# Patient Record
Sex: FEMALE | Race: WHITE | NOT HISPANIC OR LATINO | Employment: PART TIME | URBAN - METROPOLITAN AREA
[De-identification: names, ages, dates, MRNs, and addresses within clinical notes are randomized per-mention and may not be internally consistent; named-entity substitution may affect disease eponyms.]

---

## 2024-09-10 ENCOUNTER — OFFICE VISIT (OUTPATIENT)
Dept: FAMILY MEDICINE | Facility: CLINIC | Age: 49
End: 2024-09-10
Payer: COMMERCIAL

## 2024-09-10 VITALS
HEIGHT: 62 IN | BODY MASS INDEX: 26.72 KG/M2 | RESPIRATION RATE: 18 BRPM | OXYGEN SATURATION: 97 % | WEIGHT: 145.2 LBS | SYSTOLIC BLOOD PRESSURE: 122 MMHG | HEART RATE: 71 BPM | DIASTOLIC BLOOD PRESSURE: 79 MMHG | TEMPERATURE: 97.9 F

## 2024-09-10 DIAGNOSIS — R73.03 PREDIABETES: ICD-10-CM

## 2024-09-10 DIAGNOSIS — N95.1 PERIMENOPAUSAL: ICD-10-CM

## 2024-09-10 DIAGNOSIS — Z23 NEED FOR PROPHYLACTIC VACCINATION AND INOCULATION AGAINST INFLUENZA: ICD-10-CM

## 2024-09-10 DIAGNOSIS — N91.2 AMENORRHEA: ICD-10-CM

## 2024-09-10 DIAGNOSIS — E78.5 HYPERLIPIDEMIA LDL GOAL <100: ICD-10-CM

## 2024-09-10 DIAGNOSIS — Z00.00 ROUTINE GENERAL MEDICAL EXAMINATION AT A HEALTH CARE FACILITY: Primary | ICD-10-CM

## 2024-09-10 DIAGNOSIS — Z12.31 BREAST CANCER SCREENING BY MAMMOGRAM: ICD-10-CM

## 2024-09-10 DIAGNOSIS — F84.5 ASPERGER'S DISORDER: ICD-10-CM

## 2024-09-10 DIAGNOSIS — Z12.11 SCREEN FOR COLON CANCER: ICD-10-CM

## 2024-09-10 LAB
ALBUMIN SERPL BCG-MCNC: 4.4 G/DL (ref 3.5–5.2)
ALP SERPL-CCNC: 53 U/L (ref 40–150)
ALT SERPL W P-5'-P-CCNC: 14 U/L (ref 0–50)
ANION GAP SERPL CALCULATED.3IONS-SCNC: 10 MMOL/L (ref 7–15)
AST SERPL W P-5'-P-CCNC: 20 U/L (ref 0–45)
BILIRUB SERPL-MCNC: 0.3 MG/DL
BUN SERPL-MCNC: 7.7 MG/DL (ref 6–20)
CALCIUM SERPL-MCNC: 9.4 MG/DL (ref 8.8–10.4)
CHLORIDE SERPL-SCNC: 107 MMOL/L (ref 98–107)
CHOLEST SERPL-MCNC: 225 MG/DL
CREAT SERPL-MCNC: 0.61 MG/DL (ref 0.51–0.95)
EGFRCR SERPLBLD CKD-EPI 2021: >90 ML/MIN/1.73M2
ERYTHROCYTE [DISTWIDTH] IN BLOOD BY AUTOMATED COUNT: 11.7 % (ref 10–15)
ESTRADIOL SERPL-MCNC: 460 PG/ML
FASTING STATUS PATIENT QL REPORTED: NO
FSH SERPL IRP2-ACNC: 3.4 MIU/ML
GLUCOSE SERPL-MCNC: 115 MG/DL (ref 70–99)
GLUCOSE SERPL-MCNC: 115 MG/DL (ref 70–99)
HBA1C MFR BLD: 5.5 % (ref 0–5.6)
HCO3 SERPL-SCNC: 22 MMOL/L (ref 22–29)
HCT VFR BLD AUTO: 40.6 % (ref 35–47)
HDLC SERPL-MCNC: 62 MG/DL
HGB BLD-MCNC: 13.7 G/DL (ref 11.7–15.7)
LDLC SERPL CALC-MCNC: 125 MG/DL
MCH RBC QN AUTO: 31.2 PG (ref 26.5–33)
MCHC RBC AUTO-ENTMCNC: 33.7 G/DL (ref 31.5–36.5)
MCV RBC AUTO: 93 FL (ref 78–100)
NONHDLC SERPL-MCNC: 163 MG/DL
PLATELET # BLD AUTO: 248 10E3/UL (ref 150–450)
POTASSIUM SERPL-SCNC: 4.1 MMOL/L (ref 3.4–5.3)
PROT SERPL-MCNC: 6.9 G/DL (ref 6.4–8.3)
RBC # BLD AUTO: 4.39 10E6/UL (ref 3.8–5.2)
SODIUM SERPL-SCNC: 139 MMOL/L (ref 135–145)
TRIGL SERPL-MCNC: 191 MG/DL
TSH SERPL DL<=0.005 MIU/L-ACNC: 1.26 UIU/ML (ref 0.3–4.2)
VIT B12 SERPL-MCNC: 593 PG/ML (ref 232–1245)
WBC # BLD AUTO: 7.8 10E3/UL (ref 4–11)

## 2024-09-10 PROCEDURE — 83036 HEMOGLOBIN GLYCOSYLATED A1C: CPT | Performed by: INTERNAL MEDICINE

## 2024-09-10 PROCEDURE — 99386 PREV VISIT NEW AGE 40-64: CPT | Mod: 25 | Performed by: INTERNAL MEDICINE

## 2024-09-10 PROCEDURE — 85027 COMPLETE CBC AUTOMATED: CPT | Performed by: INTERNAL MEDICINE

## 2024-09-10 PROCEDURE — 84443 ASSAY THYROID STIM HORMONE: CPT | Performed by: INTERNAL MEDICINE

## 2024-09-10 PROCEDURE — 82607 VITAMIN B-12: CPT | Performed by: INTERNAL MEDICINE

## 2024-09-10 PROCEDURE — 80061 LIPID PANEL: CPT | Performed by: INTERNAL MEDICINE

## 2024-09-10 PROCEDURE — 90471 IMMUNIZATION ADMIN: CPT | Performed by: INTERNAL MEDICINE

## 2024-09-10 PROCEDURE — 84146 ASSAY OF PROLACTIN: CPT | Performed by: INTERNAL MEDICINE

## 2024-09-10 PROCEDURE — 90656 IIV3 VACC NO PRSV 0.5 ML IM: CPT | Performed by: INTERNAL MEDICINE

## 2024-09-10 PROCEDURE — 82670 ASSAY OF TOTAL ESTRADIOL: CPT | Performed by: INTERNAL MEDICINE

## 2024-09-10 PROCEDURE — 80053 COMPREHEN METABOLIC PANEL: CPT | Performed by: INTERNAL MEDICINE

## 2024-09-10 PROCEDURE — 36415 COLL VENOUS BLD VENIPUNCTURE: CPT | Performed by: INTERNAL MEDICINE

## 2024-09-10 PROCEDURE — 83001 ASSAY OF GONADOTROPIN (FSH): CPT | Performed by: INTERNAL MEDICINE

## 2024-09-10 ASSESSMENT — PAIN SCALES - GENERAL: PAINLEVEL: NO PAIN (0)

## 2024-09-10 NOTE — PROGRESS NOTES
"Preventive Care Visit  [facility] VJ Middleton MD, Internal Medicine  Sep 10, 2024      Assessment & Plan     Routine general medical examination at a health care facility  This is a new patient to me referred by her brother  She has asked Buerger's disorder but is highly functional  She will get flu shot and COVID shot  She will do a mammogram and is due for Pap smear next year  She does not want to do colonoscopy  She will do a stool study  Perimenopausal  No menstrual bleeding for 2 years  We might have to workup further with prolactin and ultrasound if not menopausal  - REVIEW OF HEALTH MAINTENANCE PROTOCOL ORDERS  - CBC with Platelets    - Follicle stimulating hormone  - Estradiol  - CBC with Platelets    - Follicle stimulating hormone  - Estradiol    Prediabetes  We might have to consider metformin based on the labs  - REVIEW OF HEALTH MAINTENANCE PROTOCOL ORDERS  - Glucose  - Comprehensive metabolic panel  - HEMOGLOBIN A1C  - TSH with free T4 reflex  - Glucose  - Comprehensive metabolic panel  - HEMOGLOBIN A1C  - TSH with free T4 reflex    Hyperlipidemia LDL goal <100  - REVIEW OF HEALTH MAINTENANCE PROTOCOL ORDERS  - Lipid panel reflex to direct LDL Non-fasting  - Vitamin B12  - Lipid panel reflex to direct LDL Non-fasting  - Vitamin B12    Screen for colon cancer    - Fecal colorectal cancer screen (FIT)  - Fecal colorectal cancer screen (FIT)    Breast cancer screening by mammogram    - MA Screening Bilateral w/ Kraig    Need for prophylactic vaccination and inoculation against influenza              BMI  Estimated body mass index is 26.56 kg/m  as calculated from the following:    Height as of this encounter: 1.575 m (5' 2\").    Weight as of this encounter: 65.9 kg (145 lb 3.2 oz).       Counseling  Appropriate preventive services were addressed with this patient via screening, questionnaire, or discussion as appropriate for fall prevention, nutrition, physical activity,  social " engagement, weight loss and cognition.  Checklist reviewing preventive services available has been given to the patient.  Reviewed patient's diet, addressing concerns and/or questions.           Subjective   Dena is a 48 year old, presenting for the following: Very nice 48 years old who has Asperger syndrome  Highly functioning and lives alone  Moved from [facility] to [place] to be close to her family  Has 3 brothers and 2 sisters and her parents doing very by  Her biological mother is   Physical, Establish Care, and Imm/Inj (Flu Shot)         Health Care Directive  Patient does not have a Health Care Directive or Living Will: Discussed advance care planning with patient; however, patient declined at this time.    History of Present Illness       Reason for visit:  Regular checkup   She is taking medications regularly.          Patient continues to work, drive her car and live independently  She does not smoke and has 1 drink per night      9/10/2024   General Health   How would you rate your overall physical health? Good   Feel stress (tense, anxious, or unable to sleep) Not at all            9/10/2024   Nutrition   Three or more servings of calcium each day? Yes   Diet: Regular (no restrictions)   How many servings of fruit and vegetables per day? (!) 0-1   How many sweetened beverages each day? (!) 3            9/10/2024   Exercise   Days per week of moderate/strenous exercise 7 days   Average minutes spent exercising at this level 40 min            9/10/2024   Social Factors   Frequency of gathering with friends or relatives Once a week   Worry food won't last until get money to buy more No   Food not last or not have enough money for food? No   Do you have housing? (Housing is defined as stable permanent housing and does not include staying ouside in a car, in a tent, in an abandoned building, in an overnight shelter, or couch-surfing.) Yes   Are you worried about losing your housing? No   Lack  of transportation? No   Unable to get utilities (heat,electricity)? No            9/10/2024   Dental   Dentist two times every year? Yes            9/10/2024   TB Screening   Were you born outside of the [place]? No            Today's PHQ-2 Score:       9/10/2024     7:55 AM   PHQ-2 ( 1999 Pfizer)   Q1: Little interest or pleasure in doing things 0   Q2: Feeling down, depressed or hopeless 0   PHQ-2 Score 0   Q1: Little interest or pleasure in doing things Not at all   Q2: Feeling down, depressed or hopeless Not at all   PHQ-2 Score 0           9/10/2024   Substance Use   Alcohol more than 3/day or more than 7/wk No   Do you use any other substances recreationally? No        Social History     Tobacco Use    Smoking status: Never    Smokeless tobacco: Never          Mammogram Screening - Mammogram every 1-2 years updated in Health Maintenance based on mutual decision making          9/10/2024   One time HIV Screening   Previous HIV test? No          9/10/2024   STI Screening   New sexual partner(s) since last STI/HIV test? No        History of abnormal Pap smear: No - age 30- 64 PAP with HPV every 5 years recommended       ASCVD Risk   The ASCVD Risk score (Tutu MART, et al., 2019) failed to calculate for the following reasons:    Cannot find a previous HDL lab    Cannot find a previous total cholesterol lab        9/10/2024   Contraception/Family Planning   Questions about contraception or family planning No           Reviewed and updated as needed this visit by Provider     Meds  Problems  Med Hx  Surg Hx  Fam Hx            BP Readings from Last 3 Encounters:   09/10/24 122/79    Wt Readings from Last 3 Encounters:   09/10/24 65.9 kg (145 lb 3.2 oz)                  Patient Active Problem List   Diagnosis    Asperger's disorder    Prediabetes    Hyperlipidemia LDL goal <100     Past Surgical History:   Procedure Laterality Date    eye surgey      age 4 years, correcftive    wisdom teeth         Social  "History     Tobacco Use    Smoking status: Never    Smokeless tobacco: Never   Substance Use Topics    Alcohol use: Not on file     Family History   Problem Relation Age of Onset    Lung Cancer Mother     Colon Cancer Paternal Grandfather          No current outpatient medications on file.     No Known Allergies      Review of Systems  Constitutional, HEENT, cardiovascular, pulmonary, GI, , musculoskeletal, neuro, skin, endocrine and psych systems are negative, except as otherwise noted.     Objective    Exam  /79 (BP Location: Left arm, Patient Position: Sitting, Cuff Size: Adult Regular)   Pulse 71   Temp 97.9  F (36.6  C) (Temporal)   Resp 18   Ht 1.575 m (5' 2\")   Wt 65.9 kg (145 lb 3.2 oz)   SpO2 97%   BMI 26.56 kg/m     Estimated body mass index is 26.56 kg/m  as calculated from the following:    Height as of this encounter: 1.575 m (5' 2\").    Weight as of this encounter: 65.9 kg (145 lb 3.2 oz).    Physical Exam  GENERAL: alert and no distress  EYES: Eyes grossly normal to inspection, PERRL and conjunctivae and sclerae normal  HENT: ear canals and TM's normal, nose and mouth without ulcers or lesions  NECK: no adenopathy, no asymmetry, masses, or scars  RESP: lungs clear to auscultation - no rales, rhonchi or wheezes  BREAST: normal without masses, tenderness or nipple discharge and no palpable axillary masses or adenopathy  CV: regular rate and rhythm, normal S1 S2, no S3 or S4, no murmur, click or rub, no peripheral edema  ABDOMEN: soft, nontender, no hepatosplenomegaly, no masses and bowel sounds normal  MS: no gross musculoskeletal defects noted, no edema  SKIN: no suspicious lesions or rashes  NEURO: Normal strength and tone, mentation intact and speech normal  PSYCH: mentation appears normal, affect normal/bright        Signed Electronically by: Alfredo Middleton MD    "

## 2024-09-10 NOTE — LETTER
September 12, 2024      Dena Handley  [address]  [address] [address]        Dear ,    We are writing to inform you of your test results.    Dena, it was nice to talk to you over phone.   Because you could not talk I am sending you this letter also.     Your labs show excellent blood counts and normal B12 value.   You are not in menopause therefore we should do ultrasound of your pelvis and see a gynecologist.   You should be called about both.     Your cholesterol and blood sugar both are high.   We should repeat them in 6 months after you try to improve your eating better and lose some weight.     Resulted Orders   Glucose   Result Value Ref Range    Glucose 115 (H) 70 - 99 mg/dL    Patient Fasting > 8hrs? No    Lipid panel reflex to direct LDL Non-fasting   Result Value Ref Range    Cholesterol 225 (H) <200 mg/dL    Triglycerides 191 (H) <150 mg/dL    Direct Measure HDL 62 >=50 mg/dL    LDL Cholesterol Calculated 125 (H) <100 mg/dL    Non HDL Cholesterol 163 (H) <130 mg/dL    Patient Fasting > 8hrs? No     Narrative    Cholesterol  Desirable: < 200 mg/dL  Borderline High: 200 - 239 mg/dL  High: >= 240 mg/dL    Triglycerides  Normal: < 150 mg/dL  Borderline High: 150 - 199 mg/dL  High: 200-499 mg/dL  Very High: >= 500 mg/dL    Direct Measure HDL  Female: >= 50 mg/dL   Male: >= 40 mg/dL    LDL Cholesterol  Desirable: < 100 mg/dL  Above Desirable: 100 - 129 mg/dL   Borderline High: 130 - 159 mg/dL   High:  160 - 189 mg/dL   Very High: >= 190 mg/dL    Non HDL Cholesterol  Desirable: < 130 mg/dL  Above Desirable: 130 - 159 mg/dL  Borderline High: 160 - 189 mg/dL  High: 190 - 219 mg/dL  Very High: >= 220 mg/dL   CBC with Platelets     Result Value Ref Range    WBC Count 7.8 4.0 - 11.0 10e3/uL    RBC Count 4.39 3.80 - 5.20 10e6/uL    Hemoglobin 13.7 11.7 - 15.7 g/dL    Hematocrit 40.6 35.0 - 47.0 %    MCV 93 78 - 100 fL    MCH 31.2 26.5 - 33.0 pg    MCHC 33.7 31.5 - 36.5 g/dL    RDW  11.7 10.0 - 15.0 %    Platelet Count 248 150 - 450 10e3/uL   Comprehensive metabolic panel   Result Value Ref Range    Sodium 139 135 - 145 mmol/L    Potassium 4.1 3.4 - 5.3 mmol/L    Carbon Dioxide (CO2) 22 22 - 29 mmol/L    Anion Gap 10 7 - 15 mmol/L    Urea Nitrogen 7.7 6.0 - 20.0 mg/dL    Creatinine 0.61 0.51 - 0.95 mg/dL    GFR Estimate >90 >60 mL/min/1.73m2      Comment:      eGFR calculated using 2021 CKD-EPI equation.    Calcium 9.4 8.8 - 10.4 mg/dL      Comment:      Reference intervals for this test were updated on 7/16/2024 to reflect our healthy population more accurately. There may be differences in the flagging of prior results with similar values performed with this method. Those prior results can be interpreted in the context of the updated reference intervals.    Chloride 107 98 - 107 mmol/L    Glucose 115 (H) 70 - 99 mg/dL    Alkaline Phosphatase 53 40 - 150 U/L    AST 20 0 - 45 U/L    ALT 14 0 - 50 U/L    Protein Total 6.9 6.4 - 8.3 g/dL    Albumin 4.4 3.5 - 5.2 g/dL    Bilirubin Total 0.3 <=1.2 mg/dL    Patient Fasting > 8hrs? No    HEMOGLOBIN A1C   Result Value Ref Range    Hemoglobin A1C 5.5 0.0 - 5.6 %      Comment:      Normal <5.7%   Prediabetes 5.7-6.4%    Diabetes 6.5% or higher     Note: Adopted from ADA consensus guidelines.   TSH with free T4 reflex   Result Value Ref Range    TSH 1.26 0.30 - 4.20 uIU/mL   Vitamin B12   Result Value Ref Range    Vitamin B12 593 232 - 1,245 pg/mL   Follicle stimulating hormone   Result Value Ref Range    FSH 3.4 mIU/mL      Comment:      19 years and older:   Follicular phase: 3.5-12.5 mIU/mL   Ovulation phase: 4.7-21.5 mIU/mL   Luteal phase: 1.7-7.7 mIU/mL   Postmenopause: 25.8-134.8 mIU/mL      Estradiol   Result Value Ref Range    Estradiol 460 pg/mL      Comment:      Healthy Men:   11.3-43.2 pg/mL    Healthy Postmenopausal Women:  Postmenopause: <5-138 pg/mL    Healthy Pregnant Women:  1st trimester: 154-3243 pg/mL  2nd trimester: 1561-27234  pg/mL  3rd trimester: 8525->64518 pg/mL    Healthy Women Cycle Phase:  Follicular: 30.9-90.4 pg/mL  Ovulation: 60.4-533 pg/mL  Luteal: 60.4-232 pg/mL    Healthy Women Cycle Sub-Phase:  Early Follicular: 20.5-62.8 pg/mL  Intermediate Follicular: 26-79.8 pg/mL  Late Follicular: 49.5-233 pg/mL  Ovulation: 60.4-602 pg/mL  Early Luteal: 51.1-179 pg/mL  Intermediate Luteal: 66.5-305 pg/mL  Late Luteal: 30.2-222 pg/mL   Prolactin   Result Value Ref Range    Prolactin 17 5 - 23 ng/mL       If you have any questions or concerns, please call the clinic at the number listed above.       Sincerely,      Alfredo Middleton MD

## 2024-09-10 NOTE — PROGRESS NOTES
{PROVIDER CHARTING PREFERENCE:576525}    Subjective   Dena is a 48 year old, presenting for the following health issues:  No chief complaint on file.  {(!) Visit Details have not yet been documented.  Please enter Visit Details and then use this list to pull in documentation. (Optional):429618}  History of Present Illness       Reason for visit:  Regular checkup   She is taking medications regularly.       {MA/LPN/RN Pre-Provider Visit Orders- hCG/UA/Strep (Optional):803484}  {SUPERLIST (Optional):943663}  {additonal problems for provider to add (Optional):139638}    {ROS Picklists (Optional):376756}      Objective    There were no vitals taken for this visit.  There is no height or weight on file to calculate BMI.  Physical Exam   {Exam List (Optional):970675}    {Diagnostic Test Results (Optional):421115}        Signed Electronically by: Alfredo Middleton MD  {Email feedback regarding this note to [email or web address]   :[id number]}

## 2024-09-10 NOTE — Clinical Note
Can you please inform the patient that she has high cholesterol which should be repeated next year She is not in menopause therefore needs to see GYN and do the ultrasound of the pelvis which has been ordered

## 2024-09-12 ENCOUNTER — TELEPHONE (OUTPATIENT)
Dept: FAMILY MEDICINE | Facility: CLINIC | Age: 49
End: 2024-09-12
Payer: COMMERCIAL

## 2024-09-12 LAB — PROLACTIN SERPL 3RD IS-MCNC: 17 NG/ML (ref 5–23)

## 2024-09-12 NOTE — TELEPHONE ENCOUNTER
Alfredo Middleton MD [facility]  Can you please inform the patient that she has high cholesterol which should be repeated next year  She is not in menopause therefore needs to see GYN and do the ultrasound of the pelvis which has been ordered    Pt informed about provider's message. Closing encounter.

## 2024-09-12 NOTE — RESULT ENCOUNTER NOTE
Dena, it was nice to talk to you over phone.  Because you could not talk I am sending you this letter also.    Your labs show excellent blood counts and normal B12 value.  You are not in menopause therefore we should do ultrasound of your pelvis and see a gynecologist.  You should be called about both.    Your cholesterol and blood sugar both are high.  We should repeat them in 6 months after you try to improve your eating better and lose some weight.

## 2024-09-12 NOTE — TELEPHONE ENCOUNTER
Patient calling clinic back regarding result note. Relayed providers note to patient. Provided GYN referral info and Imaging number to schedule appts.   Pt has no further questions.       Dena, it was nice to talk to you over phone.  Because you could not talk I am sending you this letter also.     Your labs show excellent blood counts and normal B12 value.  You are not in menopause therefore we should do ultrasound of your pelvis and see a gynecologist.  You should be called about both.     Your cholesterol and blood sugar both are high.  We should repeat them in 6 months after you try to improve your eating better and lose some weight.

## 2024-09-13 ENCOUNTER — HOSPITAL ENCOUNTER (OUTPATIENT)
Dept: MAMMOGRAPHY | Facility: CLINIC | Age: 49
Discharge: HOME | End: 2024-09-13
Attending: INTERNAL MEDICINE | Admitting: INTERNAL MEDICINE
Payer: COMMERCIAL

## 2024-09-13 DIAGNOSIS — Z12.31 BREAST CANCER SCREENING BY MAMMOGRAM: ICD-10-CM

## 2024-09-13 PROCEDURE — 77063 BREAST TOMOSYNTHESIS BI: CPT

## 2024-10-02 NOTE — PROGRESS NOTES
SUBJECTIVE:                                                   Dena Handley is a 48 year old female  who presents to clinic today for the following health issue(s):  Secondary amenorrhea.    HPI  Patient is here today to discuss secondary amenorrhea.  She reports that it has been just under 1 year that she has not had a menstrual cycle.  Prior to this, her cycles have been very irregular where she skipped several months.When she does have her menstrual cycle now, she can have bleeding for up to a few weeks at a time and it is heavy.  She did think she was undergoing perimenopause (early menopause in her sister).  However, her testing of FSH and estradiol were as expected in a premenopausal female.  Reports prior to this, she did have very regular cycles.  They occurred every month.  Bleeding was light and lasting approximately 5 days.  She did not have any associated clots.  Cramping was minimal and managed with over-the-counter medications.  She denies any abnormal vaginal discharge.  She has no itching or associated odor.   She has no breast concerns.  She denies any galactorrhea.  She had a normal mammogram.  No changes in her weight.  No hot flashes or night sweats.  No abnormal hair growth or deepening of voice.  She is not noticing any worsening acne.  She has never been sexually active.  She has been able to have Pap smears in the past but these have been very uncomfortable.  She is due for Pap smear next year (all screening has been normal).  She would prefer to defer pelvic exam today.  Only medication includes sertraline (not currently taking) and vitamin D.  Her provider did schedule a pelvic ultrasound but this has not yet been completed.  She has no history of prior abdominal surgery or uterine surgery.  Would like her COVID shot today.  Has already received her flu shot.    No LMP recorded (lmp unknown). (Menstrual status: Irregular Periods)..     Patient has not been sexually  active.  Using not sexually active for contraception.    reports that she has never smoked. She has never used smokeless tobacco.  STD testing offered?  N/A, Never sexually active    Health maintenance updated:  yes    Today's PHQ-2 Score:       9/10/2024     7:55 AM   PHQ-2 ( 1999 Pfizer)   Q1: Little interest or pleasure in doing things 0   Q2: Feeling down, depressed or hopeless 0   PHQ-2 Score 0   Q1: Little interest or pleasure in doing things Not at all   Q2: Feeling down, depressed or hopeless Not at all   PHQ-2 Score 0     Today's PHQ-9 Score:       10/4/2024     3:00 PM   PHQ-9 SCORE   PHQ-9 Total Score MyChart 0   PHQ-9 Total Score 0     Today's YOVANY-7 Score:       10/4/2024     3:01 PM   YOVANY-7 SCORE   Total Score 0 (minimal anxiety)   Total Score 0     Answers submitted by the patient for this visit:  Patient Health Questionnaire (Submitted on 10/4/2024)  If you checked off any problems, how difficult have these problems made it for you to do your work, take care of things at home, or get along with other people?: Not difficult at all  PHQ9 TOTAL SCORE: 0  Patient Health Questionnaire (G7) (Submitted on 10/4/2024)  YOVANY 7 TOTAL SCORE: 0    Problem list and histories reviewed & adjusted, as indicated.  Additional history: as documented.    Patient Active Problem List   Diagnosis    Asperger's disorder    Prediabetes    Hyperlipidemia LDL goal <100    Chronic bilateral low back pain without sciatica    Anxiety and depression    Vitamin D deficiency     Past Surgical History:   Procedure Laterality Date    eye surgey      age 4 years, correcftive    wisdom teeth        Social History     Tobacco Use    Smoking status: Never    Smokeless tobacco: Never   Substance Use Topics    Alcohol use: Not on file      Problem (# of Occurrences) Relation (Name,Age of Onset)    Colon Cancer (1) Paternal Grandfather (90)    Lung Cancer (1) Mother              Current Outpatient Medications   Medication Sig Dispense Refill     "Cholecalciferol (D 1000) 25 MCG (1000 UT) CAPS Take 2,000 Units by mouth.      sertraline (ZOLOFT) 50 MG tablet Take 1 tablet by mouth daily. (Patient not taking: Reported on 10/4/2024)       No current facility-administered medications for this visit.     No Known Allergies    ROS:  12 point review of systems negative other than symptoms noted below or in the HPI.      OBJECTIVE:     /72   Ht 1.575 m (5' 2\")   Wt 65.4 kg (144 lb 3.2 oz)   LMP  (LMP Unknown)   BMI 26.37 kg/m    Body mass index is 26.37 kg/m .    Exam:  Constitutional:  Appearance: Well nourished, well developed alert, in no acute distress     In-Clinic Test Results:  No results found for this or any previous visit (from the past 24 hour(s)).    ASSESSMENT/PLAN:                                                        ICD-10-CM    1. Amenorrhea  N91.2 Ob/Gyn  Referral          Patient is here today to discuss secondary amenorrhea and ongoing workup.  She has had a normal TSH to rule out any thyroid dysfunction.  Prolactin was normal and she denies any breast symptoms to suggest pituitary adenoma.  FSH and E2 were in appropriate premenopausal ranges, no evidence to suggest premature ovarian failure.  Patient has not had any prior abdominal surgery or uterine surgery or risk of STI/PID to suggest any ideations or intrauterine  adhesions.  She does not have any clinical signs of hyperandrogenism, but I would recommend proceeding with a pelvic ultrasound to assess for possible PCOS.  she previously had normal menstrual cycle so nothing to suggest an outflow tract issue. Did decline pelvic exam today but may reconsider pending US results. If the pelvic ultrasound does suggest PCOS, we will further discuss scheduled withdrawal bleeds and long-term management.  If pelvic ultrasound is negative, plan for progesterone withdrawal test to assess the endometrial lining.  If all of this is negative, can consider repeat FSH/E2/prolactin.  Patient " was scheduled for a pelvic ultrasound and a follow-up visit to review the results.  We discussed the components of an abdominal as well as a vaginal probe for the pelvic ultrasound. With her history of pain with pap smears, if she does not desire the transvaginal ultrasound, she knows that she can decline this at the time of the study.    Kaur Pedraza MD  [facility] [place]    On 10/04/24, a total of 45 minutes was spent caring for the patient, reviewing labs/vitals/images and EPIC notes, placing orders, documenting and coordinating care and in medical decision making.

## 2024-10-04 ENCOUNTER — OFFICE VISIT (OUTPATIENT)
Dept: OBGYN | Facility: CLINIC | Age: 49
End: 2024-10-04
Attending: INTERNAL MEDICINE
Payer: COMMERCIAL

## 2024-10-04 VITALS
HEIGHT: 62 IN | WEIGHT: 144.2 LBS | SYSTOLIC BLOOD PRESSURE: 118 MMHG | BODY MASS INDEX: 26.54 KG/M2 | DIASTOLIC BLOOD PRESSURE: 72 MMHG

## 2024-10-04 DIAGNOSIS — Z23 HIGH PRIORITY FOR 2019-NCOV VACCINE: Primary | ICD-10-CM

## 2024-10-04 DIAGNOSIS — N91.2 AMENORRHEA: ICD-10-CM

## 2024-10-04 PROBLEM — E55.9 VITAMIN D DEFICIENCY: Status: ACTIVE | Noted: 2019-04-07

## 2024-10-04 PROBLEM — M54.50 CHRONIC BILATERAL LOW BACK PAIN WITHOUT SCIATICA: Status: ACTIVE | Noted: 2017-05-05

## 2024-10-04 PROBLEM — G89.29 CHRONIC BILATERAL LOW BACK PAIN WITHOUT SCIATICA: Status: ACTIVE | Noted: 2017-05-05

## 2024-10-04 PROCEDURE — 91320 SARSCV2 VAC 30MCG TRS-SUC IM: CPT | Performed by: OBSTETRICS & GYNECOLOGY

## 2024-10-04 PROCEDURE — 90480 ADMN SARSCOV2 VAC 1/ONLY CMP: CPT | Performed by: OBSTETRICS & GYNECOLOGY

## 2024-10-04 PROCEDURE — 99204 OFFICE O/P NEW MOD 45 MIN: CPT | Mod: 25 | Performed by: OBSTETRICS & GYNECOLOGY

## 2024-10-04 RX ORDER — AMPICILLIN TRIHYDRATE 500 MG
2000 CAPSULE ORAL
COMMUNITY
Start: 2023-09-08

## 2024-10-04 ASSESSMENT — PATIENT HEALTH QUESTIONNAIRE - PHQ9
SUM OF ALL RESPONSES TO PHQ QUESTIONS 1-9: 0
10. IF YOU CHECKED OFF ANY PROBLEMS, HOW DIFFICULT HAVE THESE PROBLEMS MADE IT FOR YOU TO DO YOUR WORK, TAKE CARE OF THINGS AT HOME, OR GET ALONG WITH OTHER PEOPLE: NOT DIFFICULT AT ALL
SUM OF ALL RESPONSES TO PHQ QUESTIONS 1-9: 0

## 2024-10-04 ASSESSMENT — ANXIETY QUESTIONNAIRES
2. NOT BEING ABLE TO STOP OR CONTROL WORRYING: NOT AT ALL
1. FEELING NERVOUS, ANXIOUS, OR ON EDGE: NOT AT ALL
3. WORRYING TOO MUCH ABOUT DIFFERENT THINGS: NOT AT ALL
8. IF YOU CHECKED OFF ANY PROBLEMS, HOW DIFFICULT HAVE THESE MADE IT FOR YOU TO DO YOUR WORK, TAKE CARE OF THINGS AT HOME, OR GET ALONG WITH OTHER PEOPLE?: NOT DIFFICULT AT ALL
4. TROUBLE RELAXING: NOT AT ALL
IF YOU CHECKED OFF ANY PROBLEMS ON THIS QUESTIONNAIRE, HOW DIFFICULT HAVE THESE PROBLEMS MADE IT FOR YOU TO DO YOUR WORK, TAKE CARE OF THINGS AT HOME, OR GET ALONG WITH OTHER PEOPLE: NOT DIFFICULT AT ALL
6. BECOMING EASILY ANNOYED OR IRRITABLE: NOT AT ALL
7. FEELING AFRAID AS IF SOMETHING AWFUL MIGHT HAPPEN: NOT AT ALL
7. FEELING AFRAID AS IF SOMETHING AWFUL MIGHT HAPPEN: NOT AT ALL
GAD7 TOTAL SCORE: 0
5. BEING SO RESTLESS THAT IT IS HARD TO SIT STILL: NOT AT ALL

## 2024-11-21 NOTE — PROGRESS NOTES
SUBJECTIVE:                                                   Dena Handley is a 49 year old female who presents to clinic today for the following health issue(s):  Patient presents with:  Follow Up: US follow up     HPI:  Here for follow up of secondary amenorrhea. See previous note from 10/4/2024. Last visit she reported no cycle for 1 year. States just had a heavier cycle for the first time in November (). Cycle was heavier and associated with more cramping.   Had discussed getting an US to evaluate the ovaries for possible PCOS component.   TAUS done today showing a 7.6 mm endometrial stripe with some cystic appearance. R ovary appears normal. L ovary unable to see. Unable to tolerate TVUS.   She has already had FSH testing that was in the premenopausal range (3.4).   PMH: She denies any significant PMH that she follows with a PCP for.       Patient's last menstrual period was 2024 (exact date)..     Patient is not sexually active, .  Using none for contraception.    reports that she has never smoked. She has never used smokeless tobacco.    STD testing offered?  Declined    Health maintenance updated:  yes    Today's PHQ-2 Score:       9/10/2024     7:55 AM   PHQ-2 (  [employer])   Q1: Little interest or pleasure in doing things 0    Q2: Feeling down, depressed or hopeless 0    PHQ-2 Score 0   Q1: Little interest or pleasure in doing things Not at all   Q2: Feeling down, depressed or hopeless Not at all   PHQ-2 Score 0       Patient-reported     Today's PHQ-9 Score:       10/4/2024     3:00 PM   PHQ-9 SCORE   PHQ-9 Total Score MyChart 0   PHQ-9 Total Score 0     Today's YOVANY-7 Score:       10/4/2024     3:01 PM   YOVANY-7 SCORE   Total Score 0 (minimal anxiety)   Total Score 0       Problem list and histories reviewed & adjusted, as indicated.  Additional history: as documented.    Patient Active Problem List   Diagnosis    Asperger's disorder    Prediabetes    Hyperlipidemia LDL goal <100     Chronic bilateral low back pain without sciatica    Anxiety and depression    Vitamin D deficiency     Past Surgical History:   Procedure Laterality Date    eye surgey      age 4 years, correcftive    wisdom teeth        Social History     Tobacco Use    Smoking status: Never    Smokeless tobacco: Never   Substance Use Topics    Alcohol use: Never      Problem (# of Occurrences) Relation (Name,Age of Onset)    Colon Cancer (1) Paternal Grandfather (90)    Lung Cancer (1) Mother              Current Outpatient Medications   Medication Sig Dispense Refill    Cholecalciferol (D 1000) 25 MCG (1000 UT) CAPS Take 2,000 Units by mouth.      sertraline (ZOLOFT) 50 MG tablet Take 1 tablet by mouth daily.       No current facility-administered medications for this visit.     No Known Allergies    ROS:  12 point review of systems negative other than symptoms noted below or in the HPI.  No urinary frequency or dysuria, bladder or kidney problems      OBJECTIVE:     /64   Wt 66.7 kg (147 lb)   LMP 11/02/2024 (Exact Date)   BMI 26.89 kg/m    Body mass index is 26.89 kg/m .    Exam:  Constitutional:  Appearance: Well nourished, well developed alert, in no acute distress     In-Clinic Test Results:  Gynecological Ultrasound Report  Pelvic U/S - Transabdominal and Transvaginal  [facility]  Referring Provider: Dr. Kaur Pedraza  Sonographer:  Bonita Shelton RDMS  Indication: Amenorrhea  LMP: 11/02/24  History:   Gynecological Ultrasonography:   Uterus: anteverted. Contour is smooth/regular.  Size: 8.00 x 5.58 x 4.72 cm  Endometrium: Thickness Total 7.62 mm  Findings: Slight cystic appearance to endometrium  Right Ovary: 3.18 x 2.66 x 1.59 cm. Wnl  Left Ovary: Not seen  Cul de Sac Free Fluid: No free fluid  Technique: Transvaginal Imaging performed  Transabdominal Imaging performed     Impression:    Anteverted uterus with 7.6 mm endometrium. Endometrium not well visualized, but there is cystic  appearance to endometrium. In setting of Amenorrhea, endometrial sampling is recommended.  Right ovary appears normal  Left ovary is not visualized.    ASSESSMENT/PLAN:                                                        ICD-10-CM    1. Amenorrhea  N91.2       2. Abnormal ultrasound  R93.89         - Here for follow up of secondary amenorrhea. Patient has gone 1 year without a cycle (not just had bleeding in November) and concern for thickened cystic stripe on TAUS with recommendation for sampling. I discussed with the patient the goal of ruling out a malignancy or precancer with the biopsy. Patient notes pelvic exams are very uncomfortable and was not able to tolerate pelvic US>. I feel this would be best be completed under sedation. We discussed options for clinic sedation (for which I think she would be a good candidate) vs OR sedation in the hospital. At the same time, we could also complete her pap smear while she is comfortable. I discussed the risks/benefits/alternatives to endometrial biopsy, pap smear collection including infection, bleeding, and damage to other structures including uterine perforation. Discussed this would be done with sedation and she would require a  to bring her home. She would be interested in doing this in January from a logistic perspective. If biopsy is negative, can also consider repeating FSH and estradiol in approximately 3 months     Kaur Pedraza MD  [facility] [place]    Please schedule CRNA for:    Patient Name:  Dena Handley ([id number]).  :  1975      Expected time off from work:  Day of procedure  Surgeon:  Kaur Pedraza MD  Procedure permit to read:  exam under anesthesia, cervical papanicolaou smear, endometrial biopsy    Anesthesia:  CRNA  EQUIPMENT NEEDS: Pap supplies, EMB supplies  DIAGNOSIS: Amenorrhea    BMI MUST BE <35. WHAT IS THE PT BMI? 26  PATIENT WEIGHT 65 kg      POST OP TO BE DONE IN 2 WEEKS for 30  MINSTACY    ANY ADDIDTIONAL MEDS OR INFORMATION NEEDED Patient would prefer January for logistic purposes.

## 2024-11-22 ENCOUNTER — OFFICE VISIT (OUTPATIENT)
Dept: OBGYN | Facility: CLINIC | Age: 49
End: 2024-11-22
Attending: INTERNAL MEDICINE
Payer: COMMERCIAL

## 2024-11-22 ENCOUNTER — ANCILLARY PROCEDURE (OUTPATIENT)
Dept: ULTRASOUND IMAGING | Facility: CLINIC | Age: 49
End: 2024-11-22
Attending: INTERNAL MEDICINE
Payer: COMMERCIAL

## 2024-11-22 VITALS — BODY MASS INDEX: 26.89 KG/M2 | DIASTOLIC BLOOD PRESSURE: 64 MMHG | WEIGHT: 147 LBS | SYSTOLIC BLOOD PRESSURE: 120 MMHG

## 2024-11-22 DIAGNOSIS — R93.89 ABNORMAL ULTRASOUND: ICD-10-CM

## 2024-11-22 DIAGNOSIS — N91.2 AMENORRHEA: ICD-10-CM

## 2024-11-22 DIAGNOSIS — N91.2 AMENORRHEA: Primary | ICD-10-CM

## 2024-11-22 PROCEDURE — 76830 TRANSVAGINAL US NON-OB: CPT | Performed by: OBSTETRICS & GYNECOLOGY

## 2024-11-22 PROCEDURE — 99213 OFFICE O/P EST LOW 20 MIN: CPT | Performed by: OBSTETRICS & GYNECOLOGY

## 2024-11-22 PROCEDURE — G2211 COMPLEX E/M VISIT ADD ON: HCPCS | Performed by: OBSTETRICS & GYNECOLOGY

## 2024-11-22 PROCEDURE — 76856 US EXAM PELVIC COMPLETE: CPT | Performed by: OBSTETRICS & GYNECOLOGY

## 2024-11-25 ENCOUNTER — TELEPHONE (OUTPATIENT)
Dept: OBGYN | Facility: CLINIC | Age: 49
End: 2024-11-25

## 2024-11-25 NOTE — TELEPHONE ENCOUNTER
Please schedule CRNA for:     Patient Name:  Dena Handley ([id number]).  :  1975        Expected time off from work:  Day of procedure  Surgeon:  Kaur Pedraza MD  Procedure permit to read:  exam under anesthesia, cervical papanicolaou smear, endometrial biopsy     Anesthesia:  CRNA  EQUIPMENT NEEDS: Pap supplies, EMB supplies  DIAGNOSIS: Amenorrhea     BMI MUST BE <35. WHAT IS THE PT BMI? 26  PATIENT WEIGHT 65 kg        POST OP TO BE DONE IN 2 WEEKS for 30 MINNUTES     ANY ADDIDTIONAL MEDS OR INFORMATION NEEDED Patient would prefer January for logistic purposes.

## 2024-11-26 NOTE — TELEPHONE ENCOUNTER
Spoke to pt and advised we do not have a January schedule yet but will call her when we do    Genny Salas  Surgery Scheduler

## 2024-12-10 NOTE — TELEPHONE ENCOUNTER
CRNA SCHEDULED    2/26/2025 7:30a   ARRIVAL 7:15a  ADVISED VIA PHONE AND EMAIL, NPO,  NEEDED, SHOWER NORMAL  Email used on file [email or web address]    POST OP 3/18/2025 10:15a (3 weeks post op due to [place] vacation)    Genny Salas  Surgery Scheduler

## 2024-12-18 NOTE — TELEPHONE ENCOUNTER
Spoke to pt regarding time change. Also sent email    CRNA 2/26/2025 8:30a  ARRIVAL 8:15a    Genny Salas  Surgery Scheduler

## 2024-12-29 ENCOUNTER — HEALTH MAINTENANCE LETTER (OUTPATIENT)
Age: 49
End: 2024-12-29

## 2025-01-22 NOTE — TELEPHONE ENCOUNTER
CRNA case time has changed    CRNA 2/26/2025 9:30  ARRIVAL 9:15a  Pt aware via phone call and EMAIL sent    Genny Salas  Surgery Scheduler

## 2025-01-23 ENCOUNTER — OFFICE VISIT (OUTPATIENT)
Dept: FAMILY MEDICINE | Facility: CLINIC | Age: 50
End: 2025-01-23
Payer: COMMERCIAL

## 2025-01-23 VITALS
WEIGHT: 148 LBS | RESPIRATION RATE: 20 BRPM | OXYGEN SATURATION: 99 % | TEMPERATURE: 98.1 F | SYSTOLIC BLOOD PRESSURE: 128 MMHG | BODY MASS INDEX: 27.07 KG/M2 | HEART RATE: 82 BPM | DIASTOLIC BLOOD PRESSURE: 84 MMHG

## 2025-01-23 DIAGNOSIS — H61.23 BILATERAL IMPACTED CERUMEN: Primary | ICD-10-CM

## 2025-01-23 ASSESSMENT — PAIN SCALES - GENERAL: PAINLEVEL_OUTOF10: NO PAIN (0)

## 2025-01-23 NOTE — PROGRESS NOTES
L Ear irrigated per providers instructions 100% cerumen removed patient reported no discomfort   PT was satisfied informed provider.  Provider talked to pt and said pt can head home  Hien Johnson MA on 1/23/2025 at 5:45 PM

## 2025-01-23 NOTE — PROGRESS NOTES
Assessment & Plan     Bilateral cerumen impaction  - WA REMOVAL IMPACTED CERUMEN IRRIGATION/LVG UNILAT  Placed several drops of H2O2 into left ear. Post irrigation by clinic staff, TM's fully visualized. Patient reports hearing has improved.      Subjective   Dena is a 49 year old, presenting for the following health issues:  Cerumen (impacted)      1/23/2025     4:31 PM   Additional Questions   Roomed by Luda PATE     History of Present Illness       Reason for visit:  To have wax buildup in my ears cleaned out   She is taking medications regularly.           Objective    /84 (BP Location: Right arm, Patient Position: Sitting, Cuff Size: Adult Regular)   Pulse 82   Temp 98.1  F (36.7  C) (Temporal)   Resp 20   Wt 67.1 kg (148 lb)   LMP 11/01/2024 (Within Weeks)   SpO2 99%   BMI 27.07 kg/m    Body mass index is 27.07 kg/m .  Physical Exam   GENERAL: alert and no distress  HENT: post irrigation: ear canals and TM's normal, nose and mouth without ulcers or lesions  PSYCH: mentation appears normal, affect normal/bright            Signed Electronically by: Davion Deras NP

## 2025-02-25 NOTE — TELEPHONE ENCOUNTER
Pt called stating she needs to RS due to no ride  RS her as below.  Advised if she can not get a ride the sooner we know the better so we aren't making last minute changes  She stated she didn't know her parents were on vacation    CRNA 3/20/2025 7:30a  ARRIVAL 7:15a    Pt aware via phone and  sent    Genny Salas  Surgery Scheduler

## 2025-03-13 ENCOUNTER — MYC MEDICAL ADVICE (OUTPATIENT)
Dept: OBGYN | Facility: CLINIC | Age: 50
End: 2025-03-13
Payer: COMMERCIAL

## 2025-03-13 NOTE — TELEPHONE ENCOUNTER
"Panel Management Review    Date of last visit with a [facility] provider: Dr. Pedraza on {11/22/2024  Date of next visit with a [facility] provider: 3/20/2025    Health Maintenance List    Health Maintenance   Topic Date Due    COLORECTAL CANCER SCREENING  Never done    A1C  12/10/2024    PAP  08/11/2025    YEARLY PREVENTIVE VISIT  09/10/2025    CMP  09/10/2025    LIPID  09/10/2025    CBC  09/10/2025    MAMMO SCREENING  09/13/2026    DTAP/TDAP/TD IMMUNIZATION (2 - Td or Tdap) 05/05/2027    GLUCOSE  09/10/2027    ZOSTER IMMUNIZATION (1 of 2) 11/05/2027    ADVANCE CARE PLANNING  09/10/2029    HEPATITIS C SCREENING  Completed    PHQ-2 (once per calendar year)  Completed    INFLUENZA VACCINE  Completed    COVID-19 Vaccine  Completed    Pneumococcal Vaccine: Pediatrics (0 to 5 Years) and At-Risk Patients (6 to 49 Years)  Aged Out    HPV IMMUNIZATION  Aged Out    MENINGITIS IMMUNIZATION  Aged Out    HIV SCREENING  Discontinued    HEPATITIS B IMMUNIZATION  Discontinued       Composite cancer screening  Chart review shows that this patient is due/due soon for the following Colonoscopy  No results found for: \"PAP\"  Past Surgical History:   Procedure Laterality Date    eye surgey      age 4 years, correcftive    wisdom teeth       Summary:    Patient is due/failing the following:   Colonoscopy    Action needed: Patient needs to schedule colonoscopy     Type of outreach:  Sent [employer] message.      Staff Signature:  Izzy Cano MA on 3/13/2025 at 1:45 PM     "

## 2025-03-15 ENCOUNTER — LAB (OUTPATIENT)
Dept: LAB | Facility: CLINIC | Age: 50
End: 2025-03-15
Payer: COMMERCIAL

## 2025-03-15 DIAGNOSIS — R73.03 PREDIABETES: Primary | ICD-10-CM

## 2025-03-15 DIAGNOSIS — E78.5 HYPERLIPIDEMIA LDL GOAL <100: ICD-10-CM

## 2025-03-15 LAB
EST. AVERAGE GLUCOSE BLD GHB EST-MCNC: 108 MG/DL
HBA1C MFR BLD: 5.4 % (ref 0–5.6)

## 2025-03-15 PROCEDURE — 83036 HEMOGLOBIN GLYCOSYLATED A1C: CPT

## 2025-03-15 PROCEDURE — 80061 LIPID PANEL: CPT

## 2025-03-15 PROCEDURE — 36415 COLL VENOUS BLD VENIPUNCTURE: CPT

## 2025-03-16 LAB
CHOLEST SERPL-MCNC: 238 MG/DL
FASTING STATUS PATIENT QL REPORTED: YES
HDLC SERPL-MCNC: 54 MG/DL
LDLC SERPL CALC-MCNC: 146 MG/DL
NONHDLC SERPL-MCNC: 184 MG/DL
TRIGL SERPL-MCNC: 192 MG/DL

## 2025-03-19 NOTE — PROGRESS NOTES
INDICATIONS:                                                                                                Dena Handley is a 49 year old female,   who presents for ***. The risks, benefits and alternatives of *** were discussed in detail previously.   She has elected to go ahead with the procedure today and her questions were answered. Consent was signed.     Is a pregnancy test required: {Pregnancy test required:045549}       Was a consent obtained?  {Yes/No:262352}    PROCEDURE:                                                                                                      ***    POST PROCEDURE:                                                                                            She  {PLC Tolerance:132160}. There were no complications. Patient was discharged in stable condition.    Return to clinic in *** weeks for recheck.  Call if severe cramping, fever, abnormal bleeding, abnormal discharge or pelvic pain develop.      Kaur Pedraza MD

## 2025-03-20 ENCOUNTER — OFFICE VISIT (OUTPATIENT)
Dept: OBGYN | Facility: CLINIC | Age: 50
End: 2025-03-20
Payer: COMMERCIAL

## 2025-03-20 VITALS
HEART RATE: 64 BPM | TEMPERATURE: 97.1 F | OXYGEN SATURATION: 100 % | RESPIRATION RATE: 20 BRPM | DIASTOLIC BLOOD PRESSURE: 75 MMHG | SYSTOLIC BLOOD PRESSURE: 121 MMHG

## 2025-03-20 DIAGNOSIS — R93.5 ABNORMAL ULTRASOUND OF UTERUS: ICD-10-CM

## 2025-03-20 DIAGNOSIS — Z12.4 CERVICAL CANCER SCREENING: ICD-10-CM

## 2025-03-20 DIAGNOSIS — Z01.812 PRE-PROCEDURE LAB EXAM: Primary | ICD-10-CM

## 2025-03-20 DIAGNOSIS — N91.2 AMENORRHEA: ICD-10-CM

## 2025-03-20 LAB
HCG UR QL: NEGATIVE
INTERNAL QC OK POCT: NORMAL
POCT KIT EXPIRATION DATE: NORMAL
POCT KIT LOT NUMBER: NORMAL

## 2025-03-20 NOTE — NURSING NOTE
Patient arrived. NPO since :2000 3/19/25  : yes/no - name YesKenneth (niece)    C/O: none, nervous    Here for: Exam Under Anesthesia, Cervical Papanicolaou, Endometrial Biopsy  Diagnosis: Amenorrhea  Allergies: NKDA  Consent signed by patient, witness and provider.  See flowsheet for VS assessment throughout stay.    Urine HCG PCOT: Negative    CRNA assumed care and IV started.  Procedure complete with Dr. Pedraza without complication.    Last sedation dose: 0823    ANALGESIA SIDE EFFECTS MONITORING:    [x] Awake and alert  [] Asleep and easy to arouse  [] Slightly drowsy, easily to arouse  [] Frequently drowsy, able to arouse, but drifts off to sleep  [] Somnolent, minimal response to stimulation    Patient tolerated procedure well.  Denies pain,heavy bleeding or dizziness.  IV removed with cath tip intact.    Discharge instructions reviewed prior to sedation and reviewed at time of discharge.  Pt verbalized understanding, in agreement with plan, and voiced no further questions.    Out the door ambulatory with  at 0905  Emilie Heard RN on 3/20/2025 at 9:05 AM

## 2025-03-24 LAB
BKR AP ASSOCIATED HPV REPORT: NORMAL
BKR LAB AP GYN ADEQUACY: NORMAL
BKR LAB AP GYN INTERPRETATION: NORMAL
BKR LAB AP PREVIOUS ABNORMAL: NORMAL
PATH REPORT.COMMENTS IMP SPEC: NORMAL
PATH REPORT.COMMENTS IMP SPEC: NORMAL
PATH REPORT.RELEVANT HX SPEC: NORMAL

## 2025-05-07 ENCOUNTER — RESULTS FOLLOW-UP (OUTPATIENT)
Dept: OBGYN | Facility: CLINIC | Age: 50
End: 2025-05-07

## 2025-07-06 ENCOUNTER — HEALTH MAINTENANCE LETTER (OUTPATIENT)
Age: 50
End: 2025-07-06